# Patient Record
Sex: FEMALE | Race: WHITE | NOT HISPANIC OR LATINO | Employment: UNEMPLOYED | URBAN - METROPOLITAN AREA
[De-identification: names, ages, dates, MRNs, and addresses within clinical notes are randomized per-mention and may not be internally consistent; named-entity substitution may affect disease eponyms.]

---

## 2017-02-14 ENCOUNTER — HOSPITAL ENCOUNTER (OUTPATIENT)
Dept: LAB | Facility: HOSPITAL | Age: 37
Discharge: HOME | End: 2017-02-14
Attending: FAMILY MEDICINE | Admitting: FAMILY MEDICINE

## 2021-03-08 ENCOUNTER — LAB (OUTPATIENT)
Dept: LAB | Facility: HOSPITAL | Age: 41
End: 2021-03-08

## 2021-03-08 ENCOUNTER — TRANSCRIBE ORDERS (OUTPATIENT)
Dept: LAB | Facility: HOSPITAL | Age: 41
End: 2021-03-08

## 2021-03-08 DIAGNOSIS — R11.0 NAUSEA: ICD-10-CM

## 2021-03-08 DIAGNOSIS — R11.0 NAUSEA: Primary | ICD-10-CM

## 2021-03-08 LAB — HCG INTACT+B SERPL-ACNC: <0.5 MIU/ML

## 2021-03-08 PROCEDURE — 84702 CHORIONIC GONADOTROPIN TEST: CPT

## 2021-03-08 PROCEDURE — 36415 COLL VENOUS BLD VENIPUNCTURE: CPT

## 2021-10-04 ENCOUNTER — TRANSCRIBE ORDERS (OUTPATIENT)
Dept: ADMINISTRATIVE | Facility: HOSPITAL | Age: 41
End: 2021-10-04

## 2021-10-04 ENCOUNTER — HOSPITAL ENCOUNTER (OUTPATIENT)
Dept: GENERAL RADIOLOGY | Facility: HOSPITAL | Age: 41
Discharge: HOME | End: 2021-10-04

## 2021-10-04 ENCOUNTER — LAB (OUTPATIENT)
Dept: LAB | Facility: HOSPITAL | Age: 41
End: 2021-10-04

## 2021-10-04 DIAGNOSIS — Z01.818 PREOP TESTING: Primary | ICD-10-CM

## 2021-10-04 DIAGNOSIS — Z01.818 PREOP TESTING: ICD-10-CM

## 2021-10-04 LAB
ABO GROUP BLD: NORMAL
BASOPHILS # BLD AUTO: 0.06 10*3/MM3 (ref 0–0.2)
BASOPHILS NFR BLD AUTO: 0.9 % (ref 0–1.5)
BLD GP AB SCN SERPL QL: NEGATIVE
DEPRECATED RDW RBC AUTO: 41.1 FL (ref 37–54)
EOSINOPHIL # BLD AUTO: 0.08 10*3/MM3 (ref 0–0.4)
EOSINOPHIL NFR BLD AUTO: 1.2 % (ref 0.3–6.2)
ERYTHROCYTE [DISTWIDTH] IN BLOOD BY AUTOMATED COUNT: 11.5 % (ref 12.3–15.4)
HCG INTACT+B SERPL-ACNC: 7339 MIU/ML
HCT VFR BLD AUTO: 39.4 % (ref 34–46.6)
HGB BLD-MCNC: 13.5 G/DL (ref 12–15.9)
IMM GRANULOCYTES # BLD AUTO: 0.02 10*3/MM3 (ref 0–0.05)
IMM GRANULOCYTES NFR BLD AUTO: 0.3 % (ref 0–0.5)
LYMPHOCYTES # BLD AUTO: 1.7 10*3/MM3 (ref 0.7–3.1)
LYMPHOCYTES NFR BLD AUTO: 24.9 % (ref 19.6–45.3)
MCH RBC QN AUTO: 33.4 PG (ref 26.6–33)
MCHC RBC AUTO-ENTMCNC: 34.3 G/DL (ref 31.5–35.7)
MCV RBC AUTO: 97.5 FL (ref 79–97)
MONOCYTES # BLD AUTO: 0.55 10*3/MM3 (ref 0.1–0.9)
MONOCYTES NFR BLD AUTO: 8 % (ref 5–12)
NEUTROPHILS NFR BLD AUTO: 4.43 10*3/MM3 (ref 1.7–7)
NEUTROPHILS NFR BLD AUTO: 64.7 % (ref 42.7–76)
NRBC BLD AUTO-RTO: 0 /100 WBC (ref 0–0.2)
PLATELET # BLD AUTO: 226 10*3/MM3 (ref 140–450)
PMV BLD AUTO: 10.1 FL (ref 6–12)
RBC # BLD AUTO: 4.04 10*6/MM3 (ref 3.77–5.28)
RH BLD: POSITIVE
SARS-COV-2 RNA PNL SPEC NAA+PROBE: NOT DETECTED
T&S EXPIRATION DATE: NORMAL
TSH SERPL DL<=0.05 MIU/L-ACNC: 2.95 UIU/ML (ref 0.27–4.2)
WBC # BLD AUTO: 6.84 10*3/MM3 (ref 3.4–10.8)

## 2021-10-04 PROCEDURE — 84702 CHORIONIC GONADOTROPIN TEST: CPT

## 2021-10-04 PROCEDURE — 86900 BLOOD TYPING SEROLOGIC ABO: CPT

## 2021-10-04 PROCEDURE — 84443 ASSAY THYROID STIM HORMONE: CPT

## 2021-10-04 PROCEDURE — 85025 COMPLETE CBC W/AUTO DIFF WBC: CPT

## 2021-10-04 PROCEDURE — 86901 BLOOD TYPING SEROLOGIC RH(D): CPT

## 2021-10-04 PROCEDURE — C9803 HOPD COVID-19 SPEC COLLECT: HCPCS

## 2021-10-04 PROCEDURE — 86850 RBC ANTIBODY SCREEN: CPT

## 2021-10-04 PROCEDURE — U0003 INFECTIOUS AGENT DETECTION BY NUCLEIC ACID (DNA OR RNA); SEVERE ACUTE RESPIRATORY SYNDROME CORONAVIRUS 2 (SARS-COV-2) (CORONAVIRUS DISEASE [COVID-19]), AMPLIFIED PROBE TECHNIQUE, MAKING USE OF HIGH THROUGHPUT TECHNOLOGIES AS DESCRIBED BY CMS-2020-01-R: HCPCS

## 2021-10-04 PROCEDURE — 36415 COLL VENOUS BLD VENIPUNCTURE: CPT

## 2021-10-05 ENCOUNTER — ANESTHESIA EVENT (OUTPATIENT)
Dept: PERIOP | Facility: HOSPITAL | Age: 41
End: 2021-10-05

## 2021-10-05 ENCOUNTER — HOSPITAL ENCOUNTER (OUTPATIENT)
Facility: HOSPITAL | Age: 41
Setting detail: HOSPITAL OUTPATIENT SURGERY
Discharge: HOME | End: 2021-10-05
Attending: OBSTETRICS & GYNECOLOGY | Admitting: OBSTETRICS & GYNECOLOGY

## 2021-10-05 ENCOUNTER — ANESTHESIA (OUTPATIENT)
Dept: PERIOP | Facility: HOSPITAL | Age: 41
End: 2021-10-05

## 2021-10-05 VITALS
TEMPERATURE: 96.1 F | SYSTOLIC BLOOD PRESSURE: 99 MMHG | RESPIRATION RATE: 14 BRPM | DIASTOLIC BLOOD PRESSURE: 52 MMHG | BODY MASS INDEX: 29.73 KG/M2 | OXYGEN SATURATION: 100 % | HEART RATE: 70 BPM | WEIGHT: 167.8 LBS | HEIGHT: 63 IN

## 2021-10-05 DIAGNOSIS — O02.1 MISSED AB: ICD-10-CM

## 2021-10-05 LAB
ABO GROUP BLD: NORMAL
BLD GP AB SCN SERPL QL: NEGATIVE
GLUCOSE BLDC GLUCOMTR-MCNC: 90 MG/DL (ref 70–105)
RH BLD: POSITIVE
T&S EXPIRATION DATE: NORMAL

## 2021-10-05 PROCEDURE — 25010000002 PROPOFOL 200 MG/20ML EMULSION: Performed by: ANESTHESIOLOGY

## 2021-10-05 PROCEDURE — 25010000002 FENTANYL CITRATE (PF) 100 MCG/2ML SOLUTION: Performed by: ANESTHESIOLOGY

## 2021-10-05 PROCEDURE — 86850 RBC ANTIBODY SCREEN: CPT | Performed by: OBSTETRICS & GYNECOLOGY

## 2021-10-05 PROCEDURE — 25010000002 MIDAZOLAM PER 1 MG: Performed by: ANESTHESIOLOGY

## 2021-10-05 PROCEDURE — 86901 BLOOD TYPING SEROLOGIC RH(D): CPT | Performed by: OBSTETRICS & GYNECOLOGY

## 2021-10-05 PROCEDURE — 86900 BLOOD TYPING SEROLOGIC ABO: CPT | Performed by: OBSTETRICS & GYNECOLOGY

## 2021-10-05 PROCEDURE — 25010000002 DEXAMETHASONE PER 1 MG: Performed by: ANESTHESIOLOGY

## 2021-10-05 PROCEDURE — 82962 GLUCOSE BLOOD TEST: CPT

## 2021-10-05 PROCEDURE — 88305 TISSUE EXAM BY PATHOLOGIST: CPT | Performed by: OBSTETRICS & GYNECOLOGY

## 2021-10-05 PROCEDURE — 25010000002 KETOROLAC TROMETHAMINE PER 15 MG: Performed by: ANESTHESIOLOGY

## 2021-10-05 PROCEDURE — 25010000002 ONDANSETRON PER 1 MG: Performed by: ANESTHESIOLOGY

## 2021-10-05 RX ORDER — FLUMAZENIL 0.1 MG/ML
0.5 INJECTION INTRAVENOUS AS NEEDED
Status: DISCONTINUED | OUTPATIENT
Start: 2021-10-05 | End: 2021-10-05 | Stop reason: HOSPADM

## 2021-10-05 RX ORDER — HYDROCODONE BITARTRATE AND ACETAMINOPHEN 5; 325 MG/1; MG/1
1 TABLET ORAL ONCE AS NEEDED
Status: DISCONTINUED | OUTPATIENT
Start: 2021-10-05 | End: 2021-10-05 | Stop reason: HOSPADM

## 2021-10-05 RX ORDER — ACETAMINOPHEN 650 MG/1
650 SUPPOSITORY RECTAL ONCE AS NEEDED
Status: DISCONTINUED | OUTPATIENT
Start: 2021-10-05 | End: 2021-10-05 | Stop reason: HOSPADM

## 2021-10-05 RX ORDER — MIDAZOLAM HYDROCHLORIDE 1 MG/ML
INJECTION INTRAMUSCULAR; INTRAVENOUS AS NEEDED
Status: DISCONTINUED | OUTPATIENT
Start: 2021-10-05 | End: 2021-10-05 | Stop reason: SURG

## 2021-10-05 RX ORDER — PROMETHAZINE HYDROCHLORIDE 25 MG/1
25 SUPPOSITORY RECTAL ONCE AS NEEDED
Status: DISCONTINUED | OUTPATIENT
Start: 2021-10-05 | End: 2021-10-05 | Stop reason: HOSPADM

## 2021-10-05 RX ORDER — NALOXONE HCL 0.4 MG/ML
0.4 VIAL (ML) INJECTION AS NEEDED
Status: DISCONTINUED | OUTPATIENT
Start: 2021-10-05 | End: 2021-10-05 | Stop reason: HOSPADM

## 2021-10-05 RX ORDER — DEXAMETHASONE SODIUM PHOSPHATE 4 MG/ML
INJECTION, SOLUTION INTRA-ARTICULAR; INTRALESIONAL; INTRAMUSCULAR; INTRAVENOUS; SOFT TISSUE AS NEEDED
Status: DISCONTINUED | OUTPATIENT
Start: 2021-10-05 | End: 2021-10-05 | Stop reason: SURG

## 2021-10-05 RX ORDER — PYRAZINAMIDE 500 MG/1
1 TABLET ORAL EVERY 4 HOURS PRN
Qty: 10 TABLET | Refills: 0 | Status: SHIPPED | OUTPATIENT
Start: 2021-10-05 | End: 2022-05-27 | Stop reason: HOSPADM

## 2021-10-05 RX ORDER — MORPHINE SULFATE 4 MG/ML
2 INJECTION, SOLUTION INTRAMUSCULAR; INTRAVENOUS
Status: ACTIVE | OUTPATIENT
Start: 2021-10-05 | End: 2021-10-05

## 2021-10-05 RX ORDER — ROCURONIUM BROMIDE 10 MG/ML
INJECTION, SOLUTION INTRAVENOUS AS NEEDED
Status: DISCONTINUED | OUTPATIENT
Start: 2021-10-05 | End: 2021-10-05 | Stop reason: SURG

## 2021-10-05 RX ORDER — KETOROLAC TROMETHAMINE 15 MG/ML
15 INJECTION, SOLUTION INTRAMUSCULAR; INTRAVENOUS EVERY 6 HOURS PRN
Status: DISCONTINUED | OUTPATIENT
Start: 2021-10-05 | End: 2021-10-05 | Stop reason: HOSPADM

## 2021-10-05 RX ORDER — ACETAMINOPHEN 325 MG/1
650 TABLET ORAL ONCE AS NEEDED
Status: DISCONTINUED | OUTPATIENT
Start: 2021-10-05 | End: 2021-10-05 | Stop reason: HOSPADM

## 2021-10-05 RX ORDER — ONDANSETRON 2 MG/ML
4 INJECTION INTRAMUSCULAR; INTRAVENOUS ONCE AS NEEDED
Status: DISCONTINUED | OUTPATIENT
Start: 2021-10-05 | End: 2021-10-05 | Stop reason: HOSPADM

## 2021-10-05 RX ORDER — LORAZEPAM 2 MG/ML
0.5 INJECTION INTRAMUSCULAR
Status: DISCONTINUED | OUTPATIENT
Start: 2021-10-05 | End: 2021-10-05 | Stop reason: HOSPADM

## 2021-10-05 RX ORDER — KETOROLAC TROMETHAMINE 30 MG/ML
INJECTION, SOLUTION INTRAMUSCULAR; INTRAVENOUS AS NEEDED
Status: DISCONTINUED | OUTPATIENT
Start: 2021-10-05 | End: 2021-10-05 | Stop reason: SURG

## 2021-10-05 RX ORDER — PROPOFOL 10 MG/ML
INJECTION, EMULSION INTRAVENOUS AS NEEDED
Status: DISCONTINUED | OUTPATIENT
Start: 2021-10-05 | End: 2021-10-05 | Stop reason: SURG

## 2021-10-05 RX ORDER — ONDANSETRON 2 MG/ML
INJECTION INTRAMUSCULAR; INTRAVENOUS AS NEEDED
Status: DISCONTINUED | OUTPATIENT
Start: 2021-10-05 | End: 2021-10-05 | Stop reason: SURG

## 2021-10-05 RX ORDER — LIDOCAINE HYDROCHLORIDE 10 MG/ML
0.5 INJECTION, SOLUTION INFILTRATION; PERINEURAL ONCE AS NEEDED
Status: DISCONTINUED | OUTPATIENT
Start: 2021-10-05 | End: 2021-10-05 | Stop reason: HOSPADM

## 2021-10-05 RX ORDER — SODIUM CHLORIDE, SODIUM LACTATE, POTASSIUM CHLORIDE, CALCIUM CHLORIDE 600; 310; 30; 20 MG/100ML; MG/100ML; MG/100ML; MG/100ML
1000 INJECTION, SOLUTION INTRAVENOUS CONTINUOUS
Status: DISCONTINUED | OUTPATIENT
Start: 2021-10-05 | End: 2021-10-05 | Stop reason: HOSPADM

## 2021-10-05 RX ORDER — FENTANYL CITRATE 50 UG/ML
INJECTION, SOLUTION INTRAMUSCULAR; INTRAVENOUS AS NEEDED
Status: DISCONTINUED | OUTPATIENT
Start: 2021-10-05 | End: 2021-10-05 | Stop reason: SURG

## 2021-10-05 RX ORDER — SODIUM CHLORIDE 0.9 % (FLUSH) 0.9 %
10 SYRINGE (ML) INJECTION AS NEEDED
Status: DISCONTINUED | OUTPATIENT
Start: 2021-10-05 | End: 2021-10-05 | Stop reason: HOSPADM

## 2021-10-05 RX ORDER — PROMETHAZINE HYDROCHLORIDE 25 MG/1
25 TABLET ORAL ONCE AS NEEDED
Status: DISCONTINUED | OUTPATIENT
Start: 2021-10-05 | End: 2021-10-05 | Stop reason: HOSPADM

## 2021-10-05 RX ORDER — MEPERIDINE HYDROCHLORIDE 25 MG/ML
12.5 INJECTION INTRAMUSCULAR; INTRAVENOUS; SUBCUTANEOUS
Status: DISCONTINUED | OUTPATIENT
Start: 2021-10-05 | End: 2021-10-05 | Stop reason: HOSPADM

## 2021-10-05 RX ADMIN — CEFAZOLIN SODIUM 2 G: 1 INJECTION, POWDER, FOR SOLUTION INTRAMUSCULAR; INTRAVENOUS at 08:35

## 2021-10-05 RX ADMIN — FENTANYL CITRATE 100 MCG: 50 INJECTION, SOLUTION INTRAMUSCULAR; INTRAVENOUS at 08:33

## 2021-10-05 RX ADMIN — ONDANSETRON 4 MG: 2 INJECTION INTRAMUSCULAR; INTRAVENOUS at 08:56

## 2021-10-05 RX ADMIN — DEXAMETHASONE SODIUM PHOSPHATE 4 MG: 4 INJECTION, SOLUTION INTRAMUSCULAR; INTRAVENOUS at 08:56

## 2021-10-05 RX ADMIN — KETOROLAC TROMETHAMINE 30 MG: 30 INJECTION, SOLUTION INTRAMUSCULAR at 08:56

## 2021-10-05 RX ADMIN — SODIUM CHLORIDE, POTASSIUM CHLORIDE, SODIUM LACTATE AND CALCIUM CHLORIDE 1000 ML: 600; 310; 30; 20 INJECTION, SOLUTION INTRAVENOUS at 08:22

## 2021-10-05 RX ADMIN — MIDAZOLAM 2 MG: 1 INJECTION INTRAMUSCULAR; INTRAVENOUS at 08:33

## 2021-10-05 RX ADMIN — ROCURONIUM BROMIDE 40 MG: 10 INJECTION INTRAVENOUS at 08:35

## 2021-10-05 RX ADMIN — PROPOFOL 200 MG: 10 INJECTION, EMULSION INTRAVENOUS at 08:35

## 2021-10-05 NOTE — ANESTHESIA POSTPROCEDURE EVALUATION
Patient: Yamilex MADERA    Procedure Summary     Date: 10/05/21 Room / Location: [facility] OR 06 / [facility] MAIN OR    Anesthesia Start: 0833 Anesthesia Stop: 0909    Procedure: DILATATION AND CURETTAGE WITH SUCTION (N/A Vagina) Diagnosis:       Missed ab      (Missed ab [O02.1])    Surgeons: Magdiel Hou MD Provider: Emilio Gonzalez MD    Anesthesia Type: general ASA Status: 2          Anesthesia Type: general    Vitals  Vitals Value Taken Time   BP 98/62 10/05/21 0911   Temp     Pulse 95 10/05/21 0912   Resp     SpO2 97 % 10/05/21 0912   Vitals shown include unvalidated device data.        Post Anesthesia Care and Evaluation    Patient location during evaluation: bedside  Patient participation: complete - patient participated  Level of consciousness: awake and alert  Pain score: 1  Pain management: adequate  Airway patency: patent  Anesthetic complications: No anesthetic complications  PONV Status: none  Cardiovascular status: acceptable  Respiratory status: acceptable  Hydration status: acceptable  Post Neuraxial Block status: Motor and sensory function returned to baseline

## 2021-10-05 NOTE — OP NOTE
Subjective     Date of Service:  10/05/21    Pre-operative diagnosis(es):  41-year-old -0-1-6 with missed AB     Post-operative diagnosis(es):  Same   Procedure(s):  Suction D&C     Antibiotics: cefazolin (Ancef) ordered on call to OR     Anesthesia:  General Anesthesia     Objective      Operative findings:  Uterine sound 10 cm, 8 mm suction curette used, products of conception seen exiting the os.  Fundus firm at the end of the case.     Description of Procedure:   This patient presented to me in the office with vaginal bleeding and positive pregnancy test.  She was followed for beta hCG quant's and ultrasound.  Her ultrasound showed no fetal pole with an intrauterine gestational sac.  Her beta hCG quantitative levels were inappropriately rising and then fell.  She is Rh+.  Risks benefits and alternatives were discussed with the patient she wished to proceed with suction D&C.  Patient was taken to the operating room where general anesthetic is found to be adequate.  She was placed in the dorsal lithotomy position in Wayne stirrups.  She was sterilely prepped and draped in the normal fashion.  A weighted speculum was placed in the vagina and a single-tooth tenaculum was used to grasp the anterior lip of the cervix.  The cervix was progressively dilated.  Uterine sound was performed.  The suction curettage was then instituted.  Products of conception were evacuated from the uterus.  Next a banjo curette was used to curettage all 4 quadrants of the uterus and rough consistency was found.  The suction curette was then instituted once again and hemostasis was noted.  The instruments were removed.  The patient tolerated the procedure well.  Sponge lap needle counts were correct.  The patient went to the recovery room in stable condition and will be discharged home.   Estimated blood loss 200 cc.  Specimens removed:   Products of conception.     Complications:   None     Condition:   stable     Disposition:   to  PACU and then admit to  medical - surgical floor               Magdiel Hou MD  10/05/21  09:08 EDT

## 2021-10-05 NOTE — ANESTHESIA PREPROCEDURE EVALUATION
Anesthesia Evaluation     Patient summary reviewed and Nursing notes reviewed   NPO Solid Status: > 6 hours  NPO Liquid Status: > 6 hours           Airway   Mallampati: II  TM distance: >3 FB  Neck ROM: full  No difficulty expected  Dental - normal exam     Pulmonary - negative pulmonary ROS and normal exam    breath sounds clear to auscultation  Cardiovascular - negative cardio ROS and normal exam    ECG reviewed  Rhythm: regular  Rate: normal        Neuro/Psych- negative ROS  GI/Hepatic/Renal/Endo - negative ROS     Musculoskeletal (-) negative ROS    Abdominal   (+) obese,     Abdomen: soft.  Bowel sounds: normal.   Substance History - negative use     OB/GYN negative ob/gyn ROS         Other - negative ROS                       Anesthesia Plan    ASA 2     general     intravenous induction     Anesthetic plan, all risks, benefits, and alternatives have been provided, discussed and informed consent has been obtained with: patient.  Use of blood products discussed with patient .

## 2021-10-05 NOTE — DISCHARGE SUMMARY
"  Date of Discharge:  10/5/2021    Discharge Diagnosis: Missed AB status post suction D&C    Presenting Problem/History of Present Illness:  There are no hospital problems to display for this patient.       Missed AB  Hospital Course:  Patient is a 41 y.o. female presented with missed AB and underwent suction D&C without complication.  She was discharged home in good condition and will follow up with me in 2 weeks.  She is Rh+..      Procedures Performed:    Procedure(s):  DILATATION AND CURETTAGE WITH SUCTION  -------------------       Consults:   Consults     No orders found from 9/6/2021 to 10/6/2021.          Pertinent Test Results:   Lab Results (last 72 hours)     Procedure Component Value Units Date/Time    POC Glucose Once [[id number]]  (Normal) Collected: 10/05/21 0754    Specimen: Blood Updated: 10/05/21 0754     Glucose 90 mg/dL      Comment: Serial Number: [id number]:  [id number]           Imaging Results (Last 72 Hours)     ** No results found for the last 72 hours. **          Condition on Discharge:  Good    Vital Signs:  Vitals:    10/05/21 0734 10/05/21 0809 10/05/21 0910 10/05/21 0915   BP:  (!) 104/38 98/62 99/59   Pulse:  82 78 68   Resp:  23 10    Temp:  97.8 °F (36.6 °C) 97.1 °F (36.2 °C)    TempSrc:   Temporal    SpO2:  97% 96% 96%   Weight: 76.1 kg (167 lb 12.8 oz)      Height: 160 cm (63\")          Physical Exam:     General Appearance:    Alert, cooperative, in no acute distress   Lungs:     Clear to auscultation,respirations regular, even and                   unlabored    Heart:    Regular rhythm and normal rate.    Breast Exam:    Deferred   Abdomen:     Normal bowel sounds, no masses, no organomegaly, soft        non-tender, non-distended, no guarding, no rebound                 tenderness   Genitalia:    Deferred   Extremities:   Moves all extremities well, no edema, no cyanosis, no             redness       Discharge Disposition:  Home    Discharge Medications:     Discharge " Medications      New Medications      Instructions Start Date   acetaminophen-codeine 300-30 MG per tablet  Commonly known as: TYLENOL/CODEINE #3   1 tablet, Oral, Every 4 Hours PRN             Activity at Discharge:   Activity Instructions     Bathing Restrictions      Type of Restriction: Bathing    Bathing Restrictions: No Tub Bath    Driving Restrictions      Type of Restriction: Driving    Driving Restrictions: No Driving While Taking Narcotics    Housework Restrictions      Type of Restriction: Housework    Explain Housework Restrictions: minimize housework for 6 weeks    Lifting Restrictions      Type of Restriction: Lifting    Lifting Restrictions: Avoid Straining to Lift    Length of Lifting Restriction: 6 weeks    Pelvic Rest      Sexual Activity Restrictions      Type of Restriction: Sex    Explain Sexual Activity Restrictions: Pelvic rest for 6 weeks.    Work Restrictions      Will discuss return to work at next appointment.    Type of Restriction: Work    May Return to Work: After Next Appointment    With / Without Restrictions: With Restrictions    Explain Work Restrictions: delivery recovery          Follow-up Appointments  No future appointments.  Additional Instructions for the Follow-ups that You Need to Schedule     Call MD With Problems / Concerns   As directed      Instructions: Temperature >100.4  Bleeding >1 pad per hour  Depressed mood  Pain not controlled by pain medications.    Order Comments: Instructions: Temperature >100.4 Bleeding >1 pad per hour Depressed mood Pain not controlled by pain medications.          Discharge Follow-up with Specialty: OBGYN; 2 Weeks   As directed      Specialty: OBGYN    Follow Up: 2 Weeks               Test Results Pending at Discharge  Pending Labs     Order Current Status    Tissue Pathology Exam Collected (10/05/21 0858)           Magdiel Hou MD  10/05/21  09:16 EDT

## 2021-10-05 NOTE — H&P
"    Patient Care Team:  Provider, No Known as PCP - General    Chief complaint patient here for suction D&C    Subjective     Patient is a 41 y.o. female presents with missed AB.  There was concern earlier for a molar pregnancy however her beta hCG quant is decreasing and went from 3054-5768 and now to 7300.  These are approximate numbers.  She is Rh+.  She has had some vaginal spotting throughout her pregnancy.  She is approximately 7 weeks gestation by LMP and has to ultrasounds confirming no fetal pole.  The ultrasound shows an intrauterine gestational sac however the pregnancy is not progressing.  She is a -0-1-6.  She has had 6 vaginal births and has had a history of 1 prior molar pregnancy with D&C.    Review of Systems   Pertinent items are noted in HPI    History  Past Medical History:   Diagnosis Date   • Anxiety    • Depression      Past Surgical History:   Procedure Laterality Date   • BREAST AUGMENTATION     • TONSILLECTOMY       History reviewed. No pertinent family history.  Social History     Tobacco Use   • Smoking status: Never Smoker   • Smokeless tobacco: Never Used   Substance Use Topics   • Alcohol use: Never   • Drug use: Never       Allergies  No Known Allergies    Medications  No medications prior to admission.       Objective     Vital Signs  Vitals:    10/05/21 0734 10/05/21 0809   BP:  (!) 104/38   Pulse:  82   Resp:  23   Temp:  97.8 °F (36.6 °C)   SpO2:  97%   Weight: 76.1 kg (167 lb 12.8 oz)    Height: 160 cm (63\")        Physical Exam:      General Appearance:    Alert, cooperative, in no acute distress   Lungs:     Clear to auscultation,respirations regular.    Heart:    Regular rhythm and normal rate.   Breast Exam:    Deferred   Abdomen:     Normal bowel sounds, no masses, soft non-tender, non-distended, no guarding, no rebound tenderness   Genitalia:   Closed cervix 50% effaced   Extremities:   Moves all extremities well, no edema, no cyanosis, no              redness "   Pulses:   Pulses palpable and equal bilaterally       Results Review:      Lab Results (last 48 hours)     Procedure Component Value Units Date/Time    POC Glucose Once [[id number]]  (Normal) Collected: 10/05/21 0754    Specimen: Blood Updated: 10/05/21 0754     Glucose 90 mg/dL      Comment: Serial Number: [id number]:  [id number]              Imaging Results (Last 48 Hours)     ** No results found for the last 48 hours. **          Assessment/Plan       * No active hospital problems. *      This patient is a missed AB.  She is here for suction D&C.  She is Rh+.  Risks benefits and alternatives have been discussed with the patient including risk of bleeding and needing medications for uterine atony, and the possibility of hysterectomy if this were a repeat molar pregnancy.  Patient and  are aware.  They wish to proceed.      Magdiel Huo MD  10/05/21  08:35 EDT

## 2021-10-06 LAB
LAB AP CASE REPORT: NORMAL
PATH REPORT.FINAL DX SPEC: NORMAL
PATH REPORT.GROSS SPEC: NORMAL

## 2022-02-22 ENCOUNTER — TELEPHONE (OUTPATIENT)
Dept: FAMILY MEDICINE CLINIC | Facility: CLINIC | Age: 42
End: 2022-02-22

## 2022-02-22 NOTE — TELEPHONE ENCOUNTER
Caller: Yamilex MADERA    Relationship to patient: Self    Best call back number: [phone]    Chief complaint: NEW PATIENT, BLOOD SUGAR ISSUES, STATES HER  AND HIS PARENTS SEE PROVIDERS IN THIS OFFICE. WOULD LIKE TO TRY TO GET IN SOONER    Type of visit: NEW PATIENT     Requested date: ASAP THAT A SPOT CAN OPENED    If rescheduling, when is the original appointment: 7/11/22    Additional notes:PATIENT IS ON SCHEDULE AND ON WAIT LIST. TOLD THIS IS A REQUEST NOT A PROMISE TO BE MOVED INTO A SOONER CLOT.

## 2022-02-24 NOTE — TELEPHONE ENCOUNTER
Okay to offer sooner appt next month if patient would like to be seen prior to July.    Please schedule an afternoon appt (any time okay).

## 2022-05-26 ENCOUNTER — HOSPITAL ENCOUNTER (EMERGENCY)
Facility: HOSPITAL | Age: 42
Discharge: HOME | End: 2022-05-27
Attending: EMERGENCY MEDICINE | Admitting: OBSTETRICS & GYNECOLOGY

## 2022-05-26 ENCOUNTER — APPOINTMENT (OUTPATIENT)
Dept: ULTRASOUND IMAGING | Facility: HOSPITAL | Age: 42
End: 2022-05-26

## 2022-05-26 DIAGNOSIS — O03.9 MISCARRIAGE AT 8 TO 28 WEEKS GESTATION: Primary | ICD-10-CM

## 2022-05-26 DIAGNOSIS — N93.9 VAGINAL BLEEDING: ICD-10-CM

## 2022-05-26 DIAGNOSIS — O03.4 INCOMPLETE ABORTION: ICD-10-CM

## 2022-05-26 DIAGNOSIS — R58 BLEEDING: ICD-10-CM

## 2022-05-26 LAB
ABO GROUP BLD: NORMAL
ALBUMIN SERPL-MCNC: 4 G/DL (ref 3.5–5.2)
ALBUMIN/GLOB SERPL: 1.7 G/DL
ALP SERPL-CCNC: 55 U/L (ref 39–117)
ALT SERPL W P-5'-P-CCNC: 29 U/L (ref 1–33)
ANION GAP SERPL CALCULATED.3IONS-SCNC: 13 MMOL/L (ref 5–15)
AST SERPL-CCNC: 25 U/L (ref 1–32)
BASOPHILS # BLD AUTO: 0.1 10*3/MM3 (ref 0–0.2)
BASOPHILS NFR BLD AUTO: 0.4 % (ref 0–1.5)
BILIRUB SERPL-MCNC: 0.2 MG/DL (ref 0–1.2)
BUN SERPL-MCNC: 13 MG/DL (ref 6–20)
BUN/CREAT SERPL: 14.8 (ref 7–25)
CALCIUM SPEC-SCNC: 9 MG/DL (ref 8.6–10.5)
CHLORIDE SERPL-SCNC: 102 MMOL/L (ref 98–107)
CO2 SERPL-SCNC: 23 MMOL/L (ref 22–29)
CREAT SERPL-MCNC: 0.88 MG/DL (ref 0.57–1)
DEPRECATED RDW RBC AUTO: 43.3 FL (ref 37–54)
EGFRCR SERPLBLD CKD-EPI 2021: 83.7 ML/MIN/1.73
EOSINOPHIL # BLD AUTO: 0.1 10*3/MM3 (ref 0–0.4)
EOSINOPHIL NFR BLD AUTO: 0.6 % (ref 0.3–6.2)
ERYTHROCYTE [DISTWIDTH] IN BLOOD BY AUTOMATED COUNT: 12.8 % (ref 12.3–15.4)
GLOBULIN UR ELPH-MCNC: 2.3 GM/DL
GLUCOSE SERPL-MCNC: 125 MG/DL (ref 65–99)
HCG INTACT+B SERPL-ACNC: 1506 MIU/ML
HCT VFR BLD AUTO: 37.8 % (ref 34–46.6)
HGB BLD-MCNC: 12.3 G/DL (ref 12–15.9)
LYMPHOCYTES # BLD AUTO: 1.4 10*3/MM3 (ref 0.7–3.1)
LYMPHOCYTES NFR BLD AUTO: 9.4 % (ref 19.6–45.3)
MCH RBC QN AUTO: 31.7 PG (ref 26.6–33)
MCHC RBC AUTO-ENTMCNC: 32.6 G/DL (ref 31.5–35.7)
MCV RBC AUTO: 97.3 FL (ref 79–97)
MONOCYTES # BLD AUTO: 0.7 10*3/MM3 (ref 0.1–0.9)
MONOCYTES NFR BLD AUTO: 4.5 % (ref 5–12)
NEUTROPHILS NFR BLD AUTO: 12.9 10*3/MM3 (ref 1.7–7)
NEUTROPHILS NFR BLD AUTO: 85.1 % (ref 42.7–76)
NRBC BLD AUTO-RTO: 0 /100 WBC (ref 0–0.2)
PLATELET # BLD AUTO: 206 10*3/MM3 (ref 140–450)
PMV BLD AUTO: 7.8 FL (ref 6–12)
POTASSIUM SERPL-SCNC: 3.9 MMOL/L (ref 3.5–5.2)
PROT SERPL-MCNC: 6.3 G/DL (ref 6–8.5)
RBC # BLD AUTO: 3.89 10*6/MM3 (ref 3.77–5.28)
RH BLD: POSITIVE
SODIUM SERPL-SCNC: 138 MMOL/L (ref 136–145)
WBC NRBC COR # BLD: 15.2 10*3/MM3 (ref 3.4–10.8)

## 2022-05-26 PROCEDURE — 0 MORPHINE SULFATE 4 MG/ML SOLUTION: Performed by: PHYSICIAN ASSISTANT

## 2022-05-26 PROCEDURE — 76817 TRANSVAGINAL US OBSTETRIC: CPT

## 2022-05-26 PROCEDURE — 99284 EMERGENCY DEPT VISIT MOD MDM: CPT

## 2022-05-26 PROCEDURE — 96374 THER/PROPH/DIAG INJ IV PUSH: CPT

## 2022-05-26 PROCEDURE — 86850 RBC ANTIBODY SCREEN: CPT | Performed by: PHYSICIAN ASSISTANT

## 2022-05-26 PROCEDURE — 76801 OB US < 14 WKS SINGLE FETUS: CPT

## 2022-05-26 PROCEDURE — 86900 BLOOD TYPING SEROLOGIC ABO: CPT | Performed by: PHYSICIAN ASSISTANT

## 2022-05-26 PROCEDURE — 96361 HYDRATE IV INFUSION ADD-ON: CPT

## 2022-05-26 PROCEDURE — 84702 CHORIONIC GONADOTROPIN TEST: CPT | Performed by: PHYSICIAN ASSISTANT

## 2022-05-26 PROCEDURE — 86901 BLOOD TYPING SEROLOGIC RH(D): CPT | Performed by: PHYSICIAN ASSISTANT

## 2022-05-26 PROCEDURE — 25010000002 ONDANSETRON PER 1 MG: Performed by: PHYSICIAN ASSISTANT

## 2022-05-26 PROCEDURE — C9803 HOPD COVID-19 SPEC COLLECT: HCPCS

## 2022-05-26 PROCEDURE — 85025 COMPLETE CBC W/AUTO DIFF WBC: CPT | Performed by: PHYSICIAN ASSISTANT

## 2022-05-26 PROCEDURE — U0003 INFECTIOUS AGENT DETECTION BY NUCLEIC ACID (DNA OR RNA); SEVERE ACUTE RESPIRATORY SYNDROME CORONAVIRUS 2 (SARS-COV-2) (CORONAVIRUS DISEASE [COVID-19]), AMPLIFIED PROBE TECHNIQUE, MAKING USE OF HIGH THROUGHPUT TECHNOLOGIES AS DESCRIBED BY CMS-2020-01-R: HCPCS | Performed by: EMERGENCY MEDICINE

## 2022-05-26 PROCEDURE — 80053 COMPREHEN METABOLIC PANEL: CPT | Performed by: PHYSICIAN ASSISTANT

## 2022-05-26 PROCEDURE — 96375 TX/PRO/DX INJ NEW DRUG ADDON: CPT

## 2022-05-26 RX ORDER — MORPHINE SULFATE 4 MG/ML
2 INJECTION, SOLUTION INTRAMUSCULAR; INTRAVENOUS ONCE
Status: COMPLETED | OUTPATIENT
Start: 2022-05-26 | End: 2022-05-26

## 2022-05-26 RX ORDER — SODIUM CHLORIDE 0.9 % (FLUSH) 0.9 %
10 SYRINGE (ML) INJECTION AS NEEDED
Status: DISCONTINUED | OUTPATIENT
Start: 2022-05-26 | End: 2022-05-27 | Stop reason: HOSPADM

## 2022-05-26 RX ORDER — ONDANSETRON 2 MG/ML
4 INJECTION INTRAMUSCULAR; INTRAVENOUS ONCE
Status: COMPLETED | OUTPATIENT
Start: 2022-05-26 | End: 2022-05-26

## 2022-05-26 RX ADMIN — MORPHINE SULFATE 2 MG: 4 INJECTION INTRAVENOUS at 21:36

## 2022-05-26 RX ADMIN — SODIUM CHLORIDE 1000 ML: 9 INJECTION, SOLUTION INTRAVENOUS at 21:36

## 2022-05-26 RX ADMIN — ONDANSETRON 4 MG: 2 INJECTION INTRAMUSCULAR; INTRAVENOUS at 21:36

## 2022-05-27 ENCOUNTER — ANESTHESIA EVENT (OUTPATIENT)
Dept: PERIOP | Facility: HOSPITAL | Age: 42
End: 2022-05-27

## 2022-05-27 ENCOUNTER — ANESTHESIA (OUTPATIENT)
Dept: PERIOP | Facility: HOSPITAL | Age: 42
End: 2022-05-27

## 2022-05-27 VITALS
BODY MASS INDEX: 29.59 KG/M2 | HEART RATE: 94 BPM | HEIGHT: 63 IN | SYSTOLIC BLOOD PRESSURE: 97 MMHG | RESPIRATION RATE: 14 BRPM | DIASTOLIC BLOOD PRESSURE: 55 MMHG | TEMPERATURE: 98.2 F | OXYGEN SATURATION: 94 % | WEIGHT: 167 LBS

## 2022-05-27 PROBLEM — O03.4 INCOMPLETE ABORTION: Status: ACTIVE | Noted: 2022-05-27

## 2022-05-27 LAB
ABO GROUP BLD: NORMAL
BLD GP AB SCN SERPL QL: NEGATIVE
RH BLD: POSITIVE
SARS-COV-2 RNA RESP QL NAA+PROBE: NOT DETECTED
T&S EXPIRATION DATE: NORMAL

## 2022-05-27 PROCEDURE — 25010000002 MIDAZOLAM PER 1 MG: Performed by: ANESTHESIOLOGY

## 2022-05-27 PROCEDURE — 25010000002 METHYLERGONOVINE MALEATE PER 0.2 MG: Performed by: ANESTHESIOLOGY

## 2022-05-27 PROCEDURE — 25010000002 SUCCINYLCHOLINE PER 20 MG: Performed by: ANESTHESIOLOGY

## 2022-05-27 PROCEDURE — 88305 TISSUE EXAM BY PATHOLOGIST: CPT | Performed by: OBSTETRICS & GYNECOLOGY

## 2022-05-27 PROCEDURE — 25010000002 FENTANYL CITRATE (PF) 100 MCG/2ML SOLUTION: Performed by: ANESTHESIOLOGY

## 2022-05-27 PROCEDURE — 36415 COLL VENOUS BLD VENIPUNCTURE: CPT | Performed by: EMERGENCY MEDICINE

## 2022-05-27 PROCEDURE — 25010000002 PROPOFOL 200 MG/20ML EMULSION: Performed by: ANESTHESIOLOGY

## 2022-05-27 RX ORDER — MORPHINE SULFATE 4 MG/ML
2 INJECTION, SOLUTION INTRAMUSCULAR; INTRAVENOUS
Status: DISCONTINUED | OUTPATIENT
Start: 2022-05-27 | End: 2022-05-27 | Stop reason: HOSPADM

## 2022-05-27 RX ORDER — SODIUM CHLORIDE, SODIUM LACTATE, POTASSIUM CHLORIDE, CALCIUM CHLORIDE 600; 310; 30; 20 MG/100ML; MG/100ML; MG/100ML; MG/100ML
INJECTION, SOLUTION INTRAVENOUS CONTINUOUS PRN
Status: DISCONTINUED | OUTPATIENT
Start: 2022-05-27 | End: 2022-05-27 | Stop reason: SURG

## 2022-05-27 RX ORDER — LIDOCAINE HYDROCHLORIDE 10 MG/ML
INJECTION, SOLUTION EPIDURAL; INFILTRATION; INTRACAUDAL; PERINEURAL AS NEEDED
Status: DISCONTINUED | OUTPATIENT
Start: 2022-05-27 | End: 2022-05-27 | Stop reason: SURG

## 2022-05-27 RX ORDER — ACETAMINOPHEN 650 MG/1
650 SUPPOSITORY RECTAL ONCE AS NEEDED
Status: DISCONTINUED | OUTPATIENT
Start: 2022-05-27 | End: 2022-05-27 | Stop reason: HOSPADM

## 2022-05-27 RX ORDER — FENTANYL CITRATE 50 UG/ML
INJECTION, SOLUTION INTRAMUSCULAR; INTRAVENOUS AS NEEDED
Status: DISCONTINUED | OUTPATIENT
Start: 2022-05-27 | End: 2022-05-27 | Stop reason: SURG

## 2022-05-27 RX ORDER — ONDANSETRON 2 MG/ML
4 INJECTION INTRAMUSCULAR; INTRAVENOUS ONCE AS NEEDED
Status: DISCONTINUED | OUTPATIENT
Start: 2022-05-27 | End: 2022-05-27 | Stop reason: HOSPADM

## 2022-05-27 RX ORDER — IPRATROPIUM BROMIDE AND ALBUTEROL SULFATE 2.5; .5 MG/3ML; MG/3ML
3 SOLUTION RESPIRATORY (INHALATION) ONCE AS NEEDED
Status: DISCONTINUED | OUTPATIENT
Start: 2022-05-27 | End: 2022-05-27 | Stop reason: HOSPADM

## 2022-05-27 RX ORDER — OXYCODONE HYDROCHLORIDE AND ACETAMINOPHEN 5; 325 MG/1; MG/1
1 TABLET ORAL EVERY 6 HOURS PRN
Qty: 12 TABLET | Refills: 0 | Status: SHIPPED | OUTPATIENT
Start: 2022-05-27

## 2022-05-27 RX ORDER — MIDAZOLAM HYDROCHLORIDE 1 MG/ML
INJECTION INTRAMUSCULAR; INTRAVENOUS AS NEEDED
Status: DISCONTINUED | OUTPATIENT
Start: 2022-05-27 | End: 2022-05-27 | Stop reason: SURG

## 2022-05-27 RX ORDER — ACETAMINOPHEN 325 MG/1
650 TABLET ORAL ONCE AS NEEDED
Status: DISCONTINUED | OUTPATIENT
Start: 2022-05-27 | End: 2022-05-27 | Stop reason: HOSPADM

## 2022-05-27 RX ORDER — PROPOFOL 10 MG/ML
INJECTION, EMULSION INTRAVENOUS AS NEEDED
Status: DISCONTINUED | OUTPATIENT
Start: 2022-05-27 | End: 2022-05-27 | Stop reason: SURG

## 2022-05-27 RX ORDER — SUCCINYLCHOLINE CHLORIDE 20 MG/ML
INJECTION INTRAMUSCULAR; INTRAVENOUS AS NEEDED
Status: DISCONTINUED | OUTPATIENT
Start: 2022-05-27 | End: 2022-05-27 | Stop reason: SURG

## 2022-05-27 RX ORDER — OXYCODONE HYDROCHLORIDE 5 MG/1
5 TABLET ORAL ONCE
Status: COMPLETED | OUTPATIENT
Start: 2022-05-27 | End: 2022-05-27

## 2022-05-27 RX ORDER — METHYLERGONOVINE MALEATE 0.2 MG/ML
INJECTION INTRAVENOUS AS NEEDED
Status: DISCONTINUED | OUTPATIENT
Start: 2022-05-27 | End: 2022-05-27 | Stop reason: SURG

## 2022-05-27 RX ADMIN — MIDAZOLAM 2 MG: 1 INJECTION INTRAMUSCULAR; INTRAVENOUS at 01:04

## 2022-05-27 RX ADMIN — FENTANYL CITRATE 100 MCG: 50 INJECTION, SOLUTION INTRAMUSCULAR; INTRAVENOUS at 01:04

## 2022-05-27 RX ADMIN — OXYCODONE 5 MG: 5 TABLET ORAL at 03:12

## 2022-05-27 RX ADMIN — LIDOCAINE HYDROCHLORIDE 50 MG: 10 INJECTION, SOLUTION EPIDURAL; INFILTRATION; INTRACAUDAL; PERINEURAL at 01:04

## 2022-05-27 RX ADMIN — SUCCINYLCHOLINE CHLORIDE 140 MG: 20 INJECTION, SOLUTION INTRAMUSCULAR; INTRAVENOUS at 01:04

## 2022-05-27 RX ADMIN — LIDOCAINE HYDROCHLORIDE 50 MG: 10 INJECTION, SOLUTION EPIDURAL; INFILTRATION; INTRACAUDAL; PERINEURAL at 01:03

## 2022-05-27 RX ADMIN — METHYLERGONOVINE MALEATE 200 MCG: 0.2 INJECTION, SOLUTION INTRAMUSCULAR; INTRAVENOUS at 01:18

## 2022-05-27 RX ADMIN — PROPOFOL 150 MG: 10 INJECTION, EMULSION INTRAVENOUS at 01:04

## 2022-05-27 RX ADMIN — SODIUM CHLORIDE, SODIUM LACTATE, POTASSIUM CHLORIDE, AND CALCIUM CHLORIDE: .6; .31; .03; .02 INJECTION, SOLUTION INTRAVENOUS at 00:58

## 2022-05-31 LAB
LAB AP CASE REPORT: NORMAL
PATH REPORT.FINAL DX SPEC: NORMAL
PATH REPORT.GROSS SPEC: NORMAL

## 2022-06-09 NOTE — ANESTHESIA POSTPROCEDURE EVALUATION
Patient: Yamilex MADERA    Procedure Summary     Date: 05/27/22 Room / Location: [facility] OR 06 / [facility] MAIN OR    Anesthesia Start: 0058 Anesthesia Stop: 0129    Procedure: DILATATION AND CURETTAGE WITH SUCTION (N/A Vagina) Diagnosis: (bleeding)    Surgeons: Dedra Phelan MD Provider: Silvestre Hazel MD    Anesthesia Type: general ASA Status: 2 - Emergent          Anesthesia Type: general    Vitals  Vitals Value Taken Time   BP 97/55 05/27/22 0312   Temp 98.2 °F (36.8 °C) 05/27/22 0127   Pulse 94 05/27/22 0312   Resp 14 05/27/22 0312   SpO2 94 % 05/27/22 0312           Post Anesthesia Care and Evaluation    Patient location during evaluation: PACU  Patient participation: complete - patient participated  Level of consciousness: awake  Pain scale: See nurse's notes for pain score.  Pain management: adequate    Airway patency: patent  Anesthetic complications: No anesthetic complications  PONV Status: none  Cardiovascular status: acceptable  Respiratory status: acceptable  Hydration status: acceptable    Comments: Patient seen and examined postoperatively; vital signs stable; SpO2 greater than or equal to 90%; cardiopulmonary status stable; nausea/vomiting adequately controlled; pain adequately controlled; no apparent anesthesia complications; patient discharged from anesthesia care when discharge criteria were met

## 2022-11-10 ENCOUNTER — APPOINTMENT (OUTPATIENT)
Dept: RESPIRATORY THERAPY | Facility: HOSPITAL | Age: 42
End: 2022-11-10

## 2022-11-10 ENCOUNTER — HOSPITAL ENCOUNTER (EMERGENCY)
Facility: HOSPITAL | Age: 42
Discharge: HOME | End: 2022-11-10
Attending: EMERGENCY MEDICINE | Admitting: EMERGENCY MEDICINE

## 2022-11-10 ENCOUNTER — TRANSCRIBE ORDERS (OUTPATIENT)
Dept: ADMINISTRATIVE | Facility: HOSPITAL | Age: 42
End: 2022-11-10

## 2022-11-10 ENCOUNTER — APPOINTMENT (OUTPATIENT)
Dept: GENERAL RADIOLOGY | Facility: HOSPITAL | Age: 42
End: 2022-11-10

## 2022-11-10 VITALS
TEMPERATURE: 98 F | OXYGEN SATURATION: 100 % | HEIGHT: 63 IN | SYSTOLIC BLOOD PRESSURE: 121 MMHG | BODY MASS INDEX: 28.88 KG/M2 | DIASTOLIC BLOOD PRESSURE: 78 MMHG | RESPIRATION RATE: 13 BRPM | HEART RATE: 88 BPM | WEIGHT: 163 LBS

## 2022-11-10 DIAGNOSIS — R53.83 TIREDNESS: ICD-10-CM

## 2022-11-10 DIAGNOSIS — R00.2 PALPITATIONS: ICD-10-CM

## 2022-11-10 DIAGNOSIS — R06.02 SHORTNESS OF BREATH: ICD-10-CM

## 2022-11-10 DIAGNOSIS — R07.9 CHEST PAIN, UNSPECIFIED TYPE: Primary | ICD-10-CM

## 2022-11-10 DIAGNOSIS — R00.2 PALPITATIONS: Primary | ICD-10-CM

## 2022-11-10 LAB
ALBUMIN SERPL-MCNC: 4.6 G/DL (ref 3.5–5.2)
ALBUMIN/GLOB SERPL: 1.8 G/DL
ALP SERPL-CCNC: 63 U/L (ref 39–117)
ALT SERPL W P-5'-P-CCNC: 18 U/L (ref 1–33)
ANION GAP SERPL CALCULATED.3IONS-SCNC: 13 MMOL/L (ref 5–15)
APTT PPP: 28.5 SECONDS (ref 61–76.5)
AST SERPL-CCNC: 17 U/L (ref 1–32)
BASOPHILS # BLD AUTO: 0 10*3/MM3 (ref 0–0.2)
BASOPHILS NFR BLD AUTO: 0.5 % (ref 0–1.5)
BILIRUB SERPL-MCNC: 0.3 MG/DL (ref 0–1.2)
BUN SERPL-MCNC: 10 MG/DL (ref 6–20)
BUN/CREAT SERPL: 13.5 (ref 7–25)
CALCIUM SPEC-SCNC: 10 MG/DL (ref 8.6–10.5)
CHLORIDE SERPL-SCNC: 104 MMOL/L (ref 98–107)
CK SERPL-CCNC: 71 U/L (ref 20–180)
CO2 SERPL-SCNC: 24 MMOL/L (ref 22–29)
CREAT SERPL-MCNC: 0.74 MG/DL (ref 0.57–1)
CRP SERPL-MCNC: <0.3 MG/DL (ref 0–0.5)
D DIMER PPP FEU-MCNC: 0.34 MG/L (FEU) (ref 0–0.59)
DEPRECATED RDW RBC AUTO: 55.6 FL (ref 37–54)
EGFRCR SERPLBLD CKD-EPI 2021: 103.1 ML/MIN/1.73
EOSINOPHIL # BLD AUTO: 0 10*3/MM3 (ref 0–0.4)
EOSINOPHIL NFR BLD AUTO: 0.4 % (ref 0.3–6.2)
ERYTHROCYTE [DISTWIDTH] IN BLOOD BY AUTOMATED COUNT: 16.4 % (ref 12.3–15.4)
ERYTHROCYTE [SEDIMENTATION RATE] IN BLOOD: 8 MM/HR (ref 0–20)
GLOBULIN UR ELPH-MCNC: 2.6 GM/DL
GLUCOSE SERPL-MCNC: 126 MG/DL (ref 65–99)
HCT VFR BLD AUTO: 42.6 % (ref 34–46.6)
HGB BLD-MCNC: 14.9 G/DL (ref 12–15.9)
HOLD SPECIMEN: NORMAL
INR PPP: 1.06 (ref 0.93–1.1)
LYMPHOCYTES # BLD AUTO: 1.3 10*3/MM3 (ref 0.7–3.1)
LYMPHOCYTES NFR BLD AUTO: 17.1 % (ref 19.6–45.3)
MCH RBC QN AUTO: 32.3 PG (ref 26.6–33)
MCHC RBC AUTO-ENTMCNC: 34.9 G/DL (ref 31.5–35.7)
MCV RBC AUTO: 92.5 FL (ref 79–97)
MONOCYTES # BLD AUTO: 0.5 10*3/MM3 (ref 0.1–0.9)
MONOCYTES NFR BLD AUTO: 5.9 % (ref 5–12)
NEUTROPHILS NFR BLD AUTO: 5.8 10*3/MM3 (ref 1.7–7)
NEUTROPHILS NFR BLD AUTO: 76.1 % (ref 42.7–76)
NRBC BLD AUTO-RTO: 0 /100 WBC (ref 0–0.2)
NT-PROBNP SERPL-MCNC: 20.7 PG/ML (ref 0–450)
PLATELET # BLD AUTO: 266 10*3/MM3 (ref 140–450)
PMV BLD AUTO: 7.5 FL (ref 6–12)
POTASSIUM SERPL-SCNC: 3.6 MMOL/L (ref 3.5–5.2)
PROT SERPL-MCNC: 7.2 G/DL (ref 6–8.5)
PROTHROMBIN TIME: 10.9 SECONDS (ref 9.6–11.7)
RBC # BLD AUTO: 4.6 10*6/MM3 (ref 3.77–5.28)
SODIUM SERPL-SCNC: 141 MMOL/L (ref 136–145)
TROPONIN T SERPL-MCNC: <0.01 NG/ML (ref 0–0.03)
TSH SERPL DL<=0.05 MIU/L-ACNC: 2.79 UIU/ML (ref 0.27–4.2)
WBC NRBC COR # BLD: 7.7 10*3/MM3 (ref 3.4–10.8)

## 2022-11-10 PROCEDURE — 93005 ELECTROCARDIOGRAM TRACING: CPT

## 2022-11-10 PROCEDURE — 93270 REMOTE 30 DAY ECG REV/REPORT: CPT

## 2022-11-10 PROCEDURE — 85610 PROTHROMBIN TIME: CPT | Performed by: PHYSICIAN ASSISTANT

## 2022-11-10 PROCEDURE — 85652 RBC SED RATE AUTOMATED: CPT | Performed by: PHYSICIAN ASSISTANT

## 2022-11-10 PROCEDURE — 82550 ASSAY OF CK (CPK): CPT | Performed by: PHYSICIAN ASSISTANT

## 2022-11-10 PROCEDURE — 84484 ASSAY OF TROPONIN QUANT: CPT | Performed by: PHYSICIAN ASSISTANT

## 2022-11-10 PROCEDURE — 83880 ASSAY OF NATRIURETIC PEPTIDE: CPT | Performed by: PHYSICIAN ASSISTANT

## 2022-11-10 PROCEDURE — 71045 X-RAY EXAM CHEST 1 VIEW: CPT

## 2022-11-10 PROCEDURE — 80050 GENERAL HEALTH PANEL: CPT | Performed by: PHYSICIAN ASSISTANT

## 2022-11-10 PROCEDURE — 85730 THROMBOPLASTIN TIME PARTIAL: CPT | Performed by: PHYSICIAN ASSISTANT

## 2022-11-10 PROCEDURE — 99284 EMERGENCY DEPT VISIT MOD MDM: CPT

## 2022-11-10 PROCEDURE — 86140 C-REACTIVE PROTEIN: CPT | Performed by: PHYSICIAN ASSISTANT

## 2022-11-10 PROCEDURE — 85379 FIBRIN DEGRADATION QUANT: CPT | Performed by: PHYSICIAN ASSISTANT

## 2022-11-10 RX ORDER — SODIUM CHLORIDE 0.9 % (FLUSH) 0.9 %
10 SYRINGE (ML) INJECTION AS NEEDED
Status: DISCONTINUED | OUTPATIENT
Start: 2022-11-10 | End: 2022-11-10 | Stop reason: HOSPADM

## 2022-11-10 NOTE — DISCHARGE INSTRUCTIONS
Follow-up with primary care provider as scheduled  Follow-up with cardiologist as needed    Wear Holter monitor for the next 15 days  Contact your PCP regarding scheduling your echo    Avoid excessive caffeine intake or alcohol.    Return to the ER for new or worsening symptoms

## 2022-11-10 NOTE — ED PROVIDER NOTES
Subjective   History of Present Illness  Chief Complaint: Shortness of breath    Patient is a 42-year-old  female history of anxiety, depression presents the ER with complaints of shortness of breath, chest tightness, lightheadedness intermittently for the last 5 weeks.  Patient states 5 weeks ago she tested positive for COVID.  Patient states that she felt very sick when she had COVID but her symptoms resolved after 1 week.  Patient states since then she has had intermittent episodes of chest tightness and palpitations accompanied with shortness of breath lightheadedness and feeling like she cannot pass out.  She states that she would become pale, diaphoretic and nauseous.  She denies any syncopal episodes.  Patient states she seen by PCP, she states that PCP wanted patient to have echo and blood work obtained.  Patient states this morning she had a severe episode and she thought she is going to pass out but states she did not.  Currently patient denies any chest pain shortness of breath or cough.  No hemoptysis.  No recent travel surgery or hormone use.  No headache or dizziness.  No abdominal pain, nausea vomiting or diarrhea currently.  No lower extremity swelling.  No history of PE or DVT.    PCP: Anjum Chang    History provided by:  Patient      Review of Systems   Constitutional: Positive for fatigue. Negative for chills and fever.   HENT: Negative for sore throat and trouble swallowing.    Eyes: Negative.    Respiratory: Positive for chest tightness and shortness of breath. Negative for wheezing.    Cardiovascular: Negative for chest pain.   Gastrointestinal: Negative for abdominal pain, diarrhea, nausea and vomiting.   Genitourinary: Negative for dysuria.   Musculoskeletal: Negative for myalgias.   Skin: Negative for rash.   Allergic/Immunologic: Negative.    Neurological: Positive for light-headedness. Negative for weakness, numbness and headaches.   Psychiatric/Behavioral: Negative for  behavioral problems.   All other systems reviewed and are negative.      Past Medical History:   Diagnosis Date   • Anxiety    • Depression        No Known Allergies    Past Surgical History:   Procedure Laterality Date   • BREAST AUGMENTATION     • D & C WITH SUCTION N/A 10/5/2021    Procedure: DILATATION AND CURETTAGE WITH SUCTION;  Surgeon: Magdiel Hou MD;  Location: [facility] MAIN OR;  Service: Gynecology;  Laterality: N/A;   • D & C WITH SUCTION N/A 5/27/2022    Procedure: DILATATION AND CURETTAGE WITH SUCTION;  Surgeon: Dedra Phelan MD;  Location: [facility] MAIN OR;  Service: Obstetrics;  Laterality: N/A;   • TONSILLECTOMY         No family history on file.    Social History     Socioeconomic History   • Marital status:    Tobacco Use   • Smoking status: Never   • Smokeless tobacco: Never   Substance and Sexual Activity   • Alcohol use: Never   • Drug use: Never           Objective   Physical Exam  Vitals and nursing note reviewed.   Constitutional:       Appearance: Normal appearance. She is well-developed and normal weight. She is not ill-appearing or toxic-appearing.   HENT:      Head: Normocephalic and atraumatic.      Mouth/Throat:      Mouth: Mucous membranes are moist.   Eyes:      Pupils: Pupils are equal, round, and reactive to light.   Cardiovascular:      Rate and Rhythm: Normal rate and regular rhythm.      Heart sounds: Normal heart sounds. No murmur heard.  Pulmonary:      Effort: Pulmonary effort is normal. No respiratory distress.      Breath sounds: Normal breath sounds. No wheezing, rhonchi or rales.   Chest:      Chest wall: No mass or tenderness.   Abdominal:      General: Bowel sounds are normal. There is no distension.      Palpations: Abdomen is soft.      Tenderness: There is no abdominal tenderness.   Musculoskeletal:      Cervical back: Normal range of motion.   Skin:     General: Skin is warm and dry.      Capillary Refill: Capillary refill takes less than 2  "seconds.      Findings: No rash.   Neurological:      General: No focal deficit present.      Mental Status: She is alert and oriented to person, place, and time.   Psychiatric:         Mood and Affect: Mood normal.         Behavior: Behavior normal.         ECG 12 Lead      Date/Time: 11/10/2022 4:37 PM  Performed by: Elizabet Funk PA  Authorized by: Aaron Scott MD   Interpreted by physician  Previous ECG: no previous ECG available  Rhythm: sinus rhythm  Rate: normal  BPM: 82  QRS axis: normal  Conduction: conduction normal  ST Segments: ST segments normal  T Waves: T waves normal  Other: no other findings  Clinical impression: non-specific ECG                 ED Course  ED Course as of 11/10/22 1650   Thu Nov 10, 2022   1331 Spoke with David in the echo department, she states that patient does not have an order and is not currently scheduled for appointment.  Patient states that she will discuss this with her PCP.  Orders placed for Holter monitor [MC]   1354 Patient reevaluated, reports that she feels better.  Vital signs are stable. [MC]      ED Course User Index  [MC] Elizabet Funk PA    /78   Pulse 88   Temp 98 °F (36.7 °C)   Resp 13   Ht 160 cm (63\")   Wt 73.9 kg (163 lb)   LMP 10/24/2022   SpO2 100%   Breastfeeding No   BMI 28.87 kg/m²   Labs Reviewed   COMPREHENSIVE METABOLIC PANEL - Abnormal; Notable for the following components:       Result Value    Glucose 126 (*)     All other components within normal limits    Narrative:     GFR Normal >60  Chronic Kidney Disease <60  Kidney Failure <15     APTT - Abnormal; Notable for the following components:    PTT 28.5 (*)     All other components within normal limits   CBC WITH AUTO DIFFERENTIAL - Abnormal; Notable for the following components:    RDW 16.4 (*)     RDW-SD 55.6 (*)     Neutrophil % 76.1 (*)     Lymphocyte % 17.1 (*)     All other components within normal limits   PROTIME-INR - Normal   BNP (IN-HOUSE) - Normal    " Narrative:     Among patients with dyspnea, NT-proBNP is highly sensitive for the detection of acute congestive heart failure. In addition NT-proBNP of <300 pg/ml effectively rules out acute congestive heart failure with 99% negative predictive value.    Results may be falsely decreased if patient taking Biotin.     D-DIMER, QUANTITATIVE - Normal    Narrative:     Reference Range  --------------------------------------------------------------------     < 0.50   Negative Predictive Value  0.50-0.59   Indeterminate    >= 0.60   Probable VTE             A very low percentage of patients with DVT may yield D-Dimer results   below the cut-off of 0.50 mg/L FEU.  This is known to be more   prevalent in patients with distal DVT.             Results of this test should always be interpreted in conjunction with   the patient's medical history, clinical presentation and other   findings.  Clinical diagnosis should not be based on the result of   INNOVANCE D-Dimer alone.   TROPONIN (IN-HOUSE) - Normal    Narrative:     Troponin T Reference Range:  <= 0.03 ng/mL-   Negative for AMI  >0.03 ng/mL-     Abnormal for myocardial necrosis.  Clinicians would have to utilize clinical acumen, EKG, Troponin and serial changes to determine if it is an Acute Myocardial Infarction or myocardial injury due to an underlying chronic condition.       Results may be falsely decreased if patient taking Biotin.     SEDIMENTATION RATE - Normal   C-REACTIVE PROTEIN - Normal   CK - Normal   TSH - Normal   CBC AND DIFFERENTIAL    Narrative:     The following orders were created for panel order CBC & Differential.  Procedure                               Abnormality         Status                     ---------                               -----------         ------                     CBC Auto Differential[[id number]]        Abnormal            Final result                 Please view results for these tests on the individual orders.   EXTRA TUBES     Narrative:     The following orders were created for panel order Extra Tubes.  Procedure                               Abnormality         Status                     ---------                               -----------         ------                     Gold Top - SST[[id number]]                                   Final result                 Please view results for these tests on the individual orders.   GOLD TOP - [facility]     Medications - No data to display  XR Chest 1 View    Result Date: 11/10/2022  No acute cardiopulmonary abnormality.  Electronically Signed By-Jerry Eller MD On:11/10/2022 1:00 PM This report was finalized on [id number] by  Jerry Eller MD.                      HEART Score: 0                      MDM  Number of Diagnoses or Management Options  Palpitations  Shortness of breath  Diagnosis management comments: MEDICAL DECISION  Epic Chart Review: No recent admissions.  Comorbidities: Anxiety   differentials: Palpitations, dysrhythmia, electrolyte abnormality, UTI, pneumonia, PE; this list is not all inclusive and does not constitute the entirety of considered causes  Radiology interpretation:  Images reviewed by me and interpreted by radiologist, as above  Lab interpretation:  Labs viewed by me significant for, as above  EKG interpretation: Reviewed by self interpreted by ER attending, sinus rhythm rate of 82 with no acute ST changes.    While in the ED IV was placed and labs were obtained appropriate PPE was worn during exam and throughout all encounters with the patient.  Patient had the above evaluation.  IV established, lab work obtained.  Patient placed on continuous telemetry monitoring throughout ER stay.  Patient denies any chest pain shortness of breath or headache while in the ER.  Vital signs are stable.  EKG unremarkable.  CBC no leukocytosis.  Lab work relatively unremarkable including CMP, troponin, D-dimer, TSH, CK, sed rate, CRP.  Chest x-ray shows no acute cardiopulmonary  abnormality.  Patient no episodes of tachycardia palpitations while in the ER.  I attempted to contact outpatient CV and echo, they state that patient does not have an appointment for today.  Patient states that she will contact her PCP.  Patient placed on Holter monitor for 15 days for further evaluation.  Heart score low.  Patient to wear Holter monitor for the next few days until she can be seen for her echo in the near future.  Patient agreeable with plan of discharge.    Discharge plan and instructions were discussed with the patient who verbalized understanding and is in agreement with the plan, all questions were answered at this time.  Patient is aware of signs symptoms that would require immediate return to the emergency room.  Patient understands importance of following up with primary care provider for further evaluation and worsening concerns as well as blood pressure recheck in the next 4 weeks.    Patient was discharged in improved stable condition with an upright steady gait.         Amount and/or Complexity of Data Reviewed  Clinical lab tests: reviewed and ordered  Tests in the radiology section of CPT®: reviewed and ordered  Tests in the medicine section of CPT®: reviewed    Patient Progress  Patient progress: stable      Final diagnoses:   Palpitations   Shortness of breath       ED Disposition  ED Disposition     ED Disposition   Discharge    Condition   Stable    Comment   --             Anjum Chang MD  [address] [facility] [place] [address]  [phone]    Schedule an appointment as soon as possible for a visit in 2 days  As needed, If symptoms worsen    Krishna Araujo MD  [address] [facility] [place] [address]  [phone]    Schedule an appointment as soon as possible for a visit in 2 days  As needed, If symptoms worsen         Medication List      No changes were made to your prescriptions during this visit.          Elizabet Funk PA  11/10/22 [id number]

## 2022-11-14 LAB — QT INTERVAL: 356 MS

## 2022-11-17 ENCOUNTER — HOSPITAL ENCOUNTER (OUTPATIENT)
Dept: CARDIOLOGY | Facility: HOSPITAL | Age: 42
Discharge: HOME | End: 2022-11-17
Admitting: INTERNAL MEDICINE

## 2022-11-17 VITALS — HEIGHT: 63 IN | WEIGHT: 163 LBS | BODY MASS INDEX: 28.88 KG/M2

## 2022-11-17 DIAGNOSIS — R07.9 CHEST PAIN, UNSPECIFIED TYPE: ICD-10-CM

## 2022-11-17 DIAGNOSIS — R53.83 TIREDNESS: ICD-10-CM

## 2022-11-17 DIAGNOSIS — R00.2 PALPITATIONS: ICD-10-CM

## 2022-11-17 LAB
BH CV ECHO MEAS - ACS: 1.79 CM
BH CV ECHO MEAS - AO MAX PG: 9.3 MMHG
BH CV ECHO MEAS - AO MEAN PG: 5.6 MMHG
BH CV ECHO MEAS - AO ROOT DIAM: 3 CM
BH CV ECHO MEAS - AO V2 MAX: 152.4 CM/SEC
BH CV ECHO MEAS - AO V2 VTI: 32.3 CM
BH CV ECHO MEAS - AVA(I,D): 1.73 CM2
BH CV ECHO MEAS - EDV(CUBED): 60.2 ML
BH CV ECHO MEAS - EDV(MOD-SP2): 43.3 ML
BH CV ECHO MEAS - EDV(MOD-SP4): 56.5 ML
BH CV ECHO MEAS - EF(MOD-BP): 66 %
BH CV ECHO MEAS - EF(MOD-SP2): 75.4 %
BH CV ECHO MEAS - EF(MOD-SP4): 66.6 %
BH CV ECHO MEAS - ESV(CUBED): 18.7 ML
BH CV ECHO MEAS - ESV(MOD-SP2): 10.6 ML
BH CV ECHO MEAS - ESV(MOD-SP4): 18.8 ML
BH CV ECHO MEAS - FS: 32.3 %
BH CV ECHO MEAS - IVS/LVPW: 1.08 CM
BH CV ECHO MEAS - IVSD: 0.72 CM
BH CV ECHO MEAS - LA DIMENSION: 3.1 CM
BH CV ECHO MEAS - LV DIASTOLIC VOL/BSA (35-75): 31.8 CM2
BH CV ECHO MEAS - LV MASS(C)D: 74.8 GRAMS
BH CV ECHO MEAS - LV MAX PG: 5 MMHG
BH CV ECHO MEAS - LV MEAN PG: 2.7 MMHG
BH CV ECHO MEAS - LV SYSTOLIC VOL/BSA (12-30): 10.6 CM2
BH CV ECHO MEAS - LV V1 MAX: 111.5 CM/SEC
BH CV ECHO MEAS - LV V1 VTI: 23.1 CM
BH CV ECHO MEAS - LVIDD: 3.9 CM
BH CV ECHO MEAS - LVIDS: 2.7 CM
BH CV ECHO MEAS - LVOT AREA: 2.42 CM2
BH CV ECHO MEAS - LVOT DIAM: 1.76 CM
BH CV ECHO MEAS - LVPWD: 0.67 CM
BH CV ECHO MEAS - MV A MAX VEL: 73.4 CM/SEC
BH CV ECHO MEAS - MV DEC SLOPE: 416.7 CM/SEC2
BH CV ECHO MEAS - MV DEC TIME: 0.2 MSEC
BH CV ECHO MEAS - MV E MAX VEL: 83.1 CM/SEC
BH CV ECHO MEAS - MV E/A: 1.13
BH CV ECHO MEAS - MV MAX PG: 4.3 MMHG
BH CV ECHO MEAS - MV MEAN PG: 1.58 MMHG
BH CV ECHO MEAS - MV V2 VTI: 28.4 CM
BH CV ECHO MEAS - MVA(VTI): 1.97 CM2
BH CV ECHO MEAS - PA V2 MAX: 97.3 CM/SEC
BH CV ECHO MEAS - PULM A REVS DUR: 0.11 SEC
BH CV ECHO MEAS - PULM A REVS VEL: 44.9 CM/SEC
BH CV ECHO MEAS - PULM DIAS VEL: 41.6 CM/SEC
BH CV ECHO MEAS - PULM S/D: 1.5
BH CV ECHO MEAS - PULM SYS VEL: 62.5 CM/SEC
BH CV ECHO MEAS - RV MAX PG: 3.4 MMHG
BH CV ECHO MEAS - RV V1 MAX: 92.6 CM/SEC
BH CV ECHO MEAS - RV V1 VTI: 17.5 CM
BH CV ECHO MEAS - RVDD: 2.8 CM
BH CV ECHO MEAS - SI(MOD-SP2): 18.4 ML/M2
BH CV ECHO MEAS - SI(MOD-SP4): 21.2 ML/M2
BH CV ECHO MEAS - SV(LVOT): 55.9 ML
BH CV ECHO MEAS - SV(MOD-SP2): 32.7 ML
BH CV ECHO MEAS - SV(MOD-SP4): 37.6 ML
BH CV ECHO MEAS - TR MAX PG: 18.2 MMHG
BH CV ECHO MEAS - TR MAX VEL: 213.5 CM/SEC
MAXIMAL PREDICTED HEART RATE: 177 BPM
STRESS TARGET HR: 150 BPM

## 2022-11-17 PROCEDURE — 93306 TTE W/DOPPLER COMPLETE: CPT

## 2022-11-17 PROCEDURE — 93306 TTE W/DOPPLER COMPLETE: CPT | Performed by: INTERNAL MEDICINE

## 2022-12-18 LAB
Lab: 15
TOAL ENROLLMENT DAYS: 30

## 2022-12-18 PROCEDURE — 93272 ECG/REVIEW INTERPRET ONLY: CPT | Performed by: INTERNAL MEDICINE

## 2022-12-20 ENCOUNTER — TRANSCRIBE ORDERS (OUTPATIENT)
Dept: ADMINISTRATIVE | Facility: HOSPITAL | Age: 42
End: 2022-12-20

## 2022-12-20 DIAGNOSIS — R55 SYNCOPE AND COLLAPSE: ICD-10-CM

## 2022-12-23 DIAGNOSIS — R55 SYNCOPE, UNSPECIFIED SYNCOPE TYPE: Primary | ICD-10-CM

## 2022-12-27 ENCOUNTER — APPOINTMENT (OUTPATIENT)
Dept: RESPIRATORY THERAPY | Facility: HOSPITAL | Age: 42
End: 2022-12-27

## 2022-12-27 ENCOUNTER — APPOINTMENT (OUTPATIENT)
Dept: INFUSION THERAPY | Facility: HOSPITAL | Age: 42
End: 2022-12-27

## 2023-01-02 ENCOUNTER — TELEPHONE (OUTPATIENT)
Dept: INFUSION THERAPY | Facility: HOSPITAL | Age: 43
End: 2023-01-02
Payer: COMMERCIAL

## 2023-01-02 NOTE — TELEPHONE ENCOUNTER
Patient No showed for her scheduled ACTH stimulation appt. 1/2/23 @ 8.  Called Pt to reschedule appt left message to call back.

## 2023-01-27 ENCOUNTER — HOSPITAL ENCOUNTER (OUTPATIENT)
Dept: INFUSION THERAPY | Facility: HOSPITAL | Age: 43
Discharge: HOME | End: 2023-01-27
Admitting: INTERNAL MEDICINE
Payer: COMMERCIAL

## 2023-01-27 VITALS
TEMPERATURE: 97.2 F | SYSTOLIC BLOOD PRESSURE: 93 MMHG | DIASTOLIC BLOOD PRESSURE: 63 MMHG | HEART RATE: 66 BPM | RESPIRATION RATE: 16 BRPM | OXYGEN SATURATION: 99 %

## 2023-01-27 DIAGNOSIS — R55 SYNCOPE, UNSPECIFIED SYNCOPE TYPE: ICD-10-CM

## 2023-01-27 LAB
CORTIS 1H P CHAL SERPL-MCNC: 21.27 MCG/DL
CORTIS 30M P ACTH SERPL-MCNC: 19.32 MCG/DL
CORTIS BS SERPL-MCNC: 11.46 MCG/DL (ref 4–22)

## 2023-01-27 PROCEDURE — 25010000002 COSYNTROPIN PER 0.25 MG: Performed by: INTERNAL MEDICINE

## 2023-01-27 PROCEDURE — 96374 THER/PROPH/DIAG INJ IV PUSH: CPT

## 2023-01-27 PROCEDURE — G0463 HOSPITAL OUTPT CLINIC VISIT: HCPCS

## 2023-01-27 PROCEDURE — 82533 TOTAL CORTISOL: CPT | Performed by: INTERNAL MEDICINE

## 2023-01-27 RX ORDER — FLUOXETINE HYDROCHLORIDE 20 MG/1
1 CAPSULE ORAL DAILY
COMMUNITY
Start: 2023-01-08

## 2023-01-27 RX ORDER — FLUOXETINE HYDROCHLORIDE 20 MG/1
1 CAPSULE ORAL EVERY 24 HOURS
COMMUNITY

## 2023-01-27 RX ORDER — VALACYCLOVIR HYDROCHLORIDE 500 MG/1
TABLET, FILM COATED ORAL
COMMUNITY
Start: 2023-01-23

## 2023-01-27 RX ORDER — COSYNTROPIN 0.25 MG/ML
0.25 INJECTION, POWDER, FOR SOLUTION INTRAMUSCULAR; INTRAVENOUS ONCE
Status: COMPLETED | OUTPATIENT
Start: 2023-01-27 | End: 2023-01-27

## 2023-01-27 RX ADMIN — COSYNTROPIN 0.25 MG: 0.25 INJECTION, POWDER, LYOPHILIZED, FOR SOLUTION INTRAMUSCULAR; INTRAVENOUS at 09:13

## 2023-02-08 ENCOUNTER — TRANSCRIBE ORDERS (OUTPATIENT)
Dept: ADMINISTRATIVE | Facility: HOSPITAL | Age: 43
End: 2023-02-08
Payer: COMMERCIAL

## 2023-02-08 DIAGNOSIS — Z12.31 SCREENING MAMMOGRAM FOR BREAST CANCER: Primary | ICD-10-CM

## 2023-03-09 ENCOUNTER — HOSPITAL ENCOUNTER (OUTPATIENT)
Dept: MAMMOGRAPHY | Facility: HOSPITAL | Age: 43
Discharge: HOME | End: 2023-03-09
Admitting: OBSTETRICS & GYNECOLOGY
Payer: COMMERCIAL

## 2023-03-09 DIAGNOSIS — Z12.31 SCREENING MAMMOGRAM FOR BREAST CANCER: ICD-10-CM

## 2023-03-09 PROCEDURE — 77067 SCR MAMMO BI INCL CAD: CPT

## 2023-03-09 PROCEDURE — 77063 BREAST TOMOSYNTHESIS BI: CPT

## (undated) DEVICE — GLV SURG SENSICARE PI PF LF 7 GRN STRL

## (undated) DEVICE — DRSNG TELFA PAD NONADH STR 1S 3X8IN

## (undated) DEVICE — KT SURG TURNOVER 050

## (undated) DEVICE — CANN VAC GYN VACURETTE BERKELEY CRV 8MM 1P/U STRL

## (undated) DEVICE — TRAP TISS

## (undated) DEVICE — PK MINOR LITHOTOMY 50

## (undated) DEVICE — SYS FLUID MGMT HYST SAFETOUCH

## (undated) DEVICE — TBG W FLTR FOR BERKELEY SYSTEM

## (undated) DEVICE — GLV SURG BIOGEL SENSR LTX PF SZ6.5

## (undated) DEVICE — ST COL BERKELY TBG

## (undated) DEVICE — WET SKIN PREP TRAY: Brand: MEDLINE INDUSTRIES, INC.

## (undated) DEVICE — SOLUTION,WATER,IRRIGATION,1000ML,STERILE: Brand: MEDLINE

## (undated) DEVICE — HOSE BT TO BT VAC CURETTAGE SNGL PT USE

## (undated) DEVICE — CAP SEAL BERKLEY COL SYS ASSORT PK/5